# Patient Record
Sex: MALE | Race: BLACK OR AFRICAN AMERICAN | NOT HISPANIC OR LATINO | URBAN - METROPOLITAN AREA
[De-identification: names, ages, dates, MRNs, and addresses within clinical notes are randomized per-mention and may not be internally consistent; named-entity substitution may affect disease eponyms.]

---

## 2020-02-27 ENCOUNTER — EMERGENCY (EMERGENCY)
Facility: HOSPITAL | Age: 55
LOS: 1 days | Discharge: HOME | End: 2020-02-27
Admitting: EMERGENCY MEDICINE
Payer: SELF-PAY

## 2020-02-27 VITALS
HEART RATE: 82 BPM | OXYGEN SATURATION: 98 % | DIASTOLIC BLOOD PRESSURE: 73 MMHG | TEMPERATURE: 97 F | SYSTOLIC BLOOD PRESSURE: 166 MMHG | RESPIRATION RATE: 16 BRPM

## 2020-02-27 PROCEDURE — 99283 EMERGENCY DEPT VISIT LOW MDM: CPT

## 2020-02-27 RX ORDER — IBUPROFEN 200 MG
1 TABLET ORAL
Qty: 30 | Refills: 0
Start: 2020-02-27 | End: 2020-03-07

## 2020-02-27 RX ORDER — KETOCONAZOLE 20 MG/G
1 AEROSOL, FOAM TOPICAL
Qty: 60 | Refills: 0
Start: 2020-02-27 | End: 2020-03-11

## 2020-02-27 RX ORDER — IBUPROFEN 200 MG
800 TABLET ORAL ONCE
Refills: 0 | Status: DISCONTINUED | OUTPATIENT
Start: 2020-02-27 | End: 2020-03-04

## 2020-02-27 RX ORDER — ACETAMINOPHEN 500 MG
975 TABLET ORAL ONCE
Refills: 0 | Status: COMPLETED | OUTPATIENT
Start: 2020-02-27 | End: 2020-02-27

## 2020-02-27 RX ORDER — OXYCODONE AND ACETAMINOPHEN 5; 325 MG/1; MG/1
1 TABLET ORAL ONCE
Refills: 0 | Status: COMPLETED | OUTPATIENT
Start: 2020-02-27 | End: 2020-02-27

## 2020-02-27 RX ORDER — NITROFURANTOIN MACROCRYSTAL 50 MG
100 CAPSULE ORAL
Refills: 0 | Status: DISCONTINUED | OUTPATIENT
Start: 2020-02-27 | End: 2020-02-27

## 2020-02-27 RX ORDER — CEPHALEXIN 500 MG
1 CAPSULE ORAL
Qty: 20 | Refills: 0
Start: 2020-02-27 | End: 2020-03-07

## 2020-02-27 RX ORDER — AZTREONAM 2 G
1 VIAL (EA) INJECTION
Qty: 20 | Refills: 0
Start: 2020-02-27 | End: 2020-03-07

## 2020-02-27 RX ORDER — CEPHALEXIN 500 MG
500 CAPSULE ORAL ONCE
Refills: 0 | Status: COMPLETED | OUTPATIENT
Start: 2020-02-27 | End: 2020-02-27

## 2020-02-27 RX ORDER — IBUPROFEN 200 MG
800 TABLET ORAL ONCE
Refills: 0 | Status: COMPLETED | OUTPATIENT
Start: 2020-02-27 | End: 2020-02-27

## 2020-02-27 RX ADMIN — Medication 500 MILLIGRAM(S): at 21:41

## 2020-02-27 RX ADMIN — Medication 975 MILLIGRAM(S): at 21:58

## 2020-02-27 RX ADMIN — Medication 1 TABLET(S): at 21:41

## 2020-02-27 RX ADMIN — Medication 800 MILLIGRAM(S): at 21:57

## 2020-02-27 NOTE — ED PROVIDER NOTE - MUSCULOSKELETAL, MLM
Bilateral lower extremity swelling consistent with chronic PVD, some wet clear drainage on both legs. No fluctuance, no abscess, no purulent discharge, no open wounds, no ulcers, good pulses.

## 2020-02-27 NOTE — ED PROVIDER NOTE - OBJECTIVE STATEMENT
53 y/o male with no significant PMHx presents to ED c/o bilateral leg swelling x 1 week. Patient denies any fever, chills, CP, SOB or leg pain. Also denies any recent injuries or trauma.

## 2020-02-27 NOTE — ED PROVIDER NOTE - PATIENT PORTAL LINK FT
You can access the FollowMyHealth Patient Portal offered by [facility] by registering at the following website: [email or web address]. By joining [employer]’s FollowMyHealth portal, you will also be able to view your health information using other applications (apps) compatible with our system.

## 2020-02-27 NOTE — ED ADULT NURSE NOTE - CHPI ED NUR SYMPTOMS NEG
no difficulty bearing weight/no fever/no tingling/no deformity/no numbness/no stiffness/no abrasion/no back pain/no bruising/no weakness

## 2020-03-04 DIAGNOSIS — M79.669 PAIN IN UNSPECIFIED LOWER LEG: ICD-10-CM

## 2020-03-04 DIAGNOSIS — I73.9 PERIPHERAL VASCULAR DISEASE, UNSPECIFIED: ICD-10-CM

## 2020-04-02 ENCOUNTER — EMERGENCY (EMERGENCY)
Facility: HOSPITAL | Age: 55
LOS: 1 days | Discharge: HOME | End: 2020-04-02
Admitting: EMERGENCY MEDICINE
Payer: SELF-PAY

## 2020-04-02 VITALS
OXYGEN SATURATION: 97 % | HEART RATE: 108 BPM | SYSTOLIC BLOOD PRESSURE: 168 MMHG | TEMPERATURE: 100 F | HEIGHT: 73 IN | RESPIRATION RATE: 18 BRPM | DIASTOLIC BLOOD PRESSURE: 92 MMHG | WEIGHT: 250 LBS

## 2020-04-02 PROBLEM — Z78.9 OTHER SPECIFIED HEALTH STATUS: Chronic | Status: ACTIVE | Noted: 2020-02-27

## 2020-04-02 PROCEDURE — 99283 EMERGENCY DEPT VISIT LOW MDM: CPT

## 2020-04-02 RX ORDER — BACITRACIN ZINC 500 UNIT/G
1 OINTMENT IN PACKET (EA) TOPICAL
Qty: 60 | Refills: 0
Start: 2020-04-02 | End: 2020-04-15

## 2020-04-02 RX ORDER — BACITRACIN ZINC 500 UNIT/G
1 OINTMENT IN PACKET (EA) TOPICAL ONCE
Refills: 0 | Status: COMPLETED | OUTPATIENT
Start: 2020-04-02 | End: 2020-04-02

## 2020-04-02 RX ORDER — FUROSEMIDE 40 MG
1 TABLET ORAL
Qty: 5 | Refills: 0
Start: 2020-04-02 | End: 2020-04-06

## 2020-04-02 RX ORDER — ACETAMINOPHEN 500 MG
650 TABLET ORAL ONCE
Refills: 0 | Status: COMPLETED | OUTPATIENT
Start: 2020-04-02 | End: 2020-04-02

## 2020-04-02 RX ADMIN — Medication 650 MILLIGRAM(S): at 10:32

## 2020-04-02 RX ADMIN — Medication 1 APPLICATION(S): at 10:32

## 2020-04-02 NOTE — ED PROVIDER NOTE - PATIENT PORTAL LINK FT
You can access the FollowMyHealth Patient Portal offered by [facility] by registering at the following website: [email or web address]. By joining [employer]’s FollowMyHealth portal, you will also be able to view your health information using other applications (apps) compatible with our system.

## 2020-04-02 NOTE — ED PROVIDER NOTE - OBJECTIVE STATEMENT
55 year old male with no reported PMH here for evaluation of leg swelling. States he has had it for months and has not been able to get his medications (does not know name.) He has not followed up with PCP as he goes to the ER when he has an issue.   Denies any fever, chills, shortness of breath, chest pain, palpitations.

## 2020-04-02 NOTE — ED PROVIDER NOTE - NSFOLLOWUPINSTRUCTIONS_ED_ALL_ED_FT
Apply ointment twice daily. Take water pill (lasix) as prescribed. Please follow up with your primary care physician. Apply ointment twice daily. Take water pill (Lasix) as prescribed. Please follow up with your primary care physician.

## 2020-04-02 NOTE — ED PROVIDER NOTE - CLINICAL SUMMARY MEDICAL DECISION MAKING FREE TEXT BOX
55 year old male with no reported PMH here for evaluation of bilateral leg swelling. Exam consistent with chronic stasis dermatitis with no concern for cellulitis requiring oral/IV abx at this time. Patient non compliant with follow up, prescriptions. RX for lasix sent to local pharmacy, encouraged patient to follow up with his PCP. Skin care instructions were provided to him.        I have discussed the discharge plan with the patient. The patient agrees with the plan, as discussed.  The patient understands Emergency Department diagnosis is a preliminary diagnosis often based on limited information and that the patient must adhere to the follow-up plan as discussed.  The patient understands that if the symptoms worsen or if prescribed medications do not have the desired/planned effect that the patient must/may return to the closest Emergency Department at any time for further evaluation and treatment.

## 2020-04-02 NOTE — ED ADULT TRIAGE NOTE - CHIEF COMPLAINT QUOTE
Pt CO pain to Bilat LEs and Feet x1 month.  Pt denies falls, trauma, SOB, Fevers, Cough, CP, Dizziness.  Pt teary eyed during triage.

## 2020-04-02 NOTE — ED ADULT NURSE NOTE - OBJECTIVE STATEMENT
stopped taking medicine for leg swelling a month ago; legs are still swollen, scaly and weeping; no redness or signs of infection

## 2020-04-02 NOTE — ED PROVIDER NOTE - CHPI ED SYMPTOMS NEG
no dizziness/no fever/no headache/no chills/no decreased eating/drinking/no loss of consciousness/no nausea/no pain/no vomiting/no back pain

## 2020-04-06 DIAGNOSIS — M79.669 PAIN IN UNSPECIFIED LOWER LEG: ICD-10-CM

## 2020-04-06 DIAGNOSIS — L30.8 OTHER SPECIFIED DERMATITIS: ICD-10-CM

## 2022-07-14 NOTE — ED ADULT NURSE NOTE - CCCP TRG CHIEF CMPLNT
[facility]    Acute pain service Inpatient Progress Note    Patient Name: Altagracia Rothman  :  1947  MRN:  [id number]        Treatment Plan                                        IN OR at time of rounds   lower leg pain/injury

## 2023-07-09 NOTE — ED PROVIDER NOTE - CPE EDP PSYCH NORM
FAMILY HISTORY:  Father  Still living? No  FH: heart disease, Age at diagnosis: Age Unknown    Sibling  Still living? Yes, Estimated age: Age Unknown  FH: uterine cancer, Age at diagnosis: Age Unknown     normal...